# Patient Record
Sex: FEMALE | Race: BLACK OR AFRICAN AMERICAN | NOT HISPANIC OR LATINO | URBAN - METROPOLITAN AREA
[De-identification: names, ages, dates, MRNs, and addresses within clinical notes are randomized per-mention and may not be internally consistent; named-entity substitution may affect disease eponyms.]

---

## 2018-12-01 ENCOUNTER — EMERGENCY (EMERGENCY)
Facility: HOSPITAL | Age: 53
LOS: 1 days | Discharge: HOME | End: 2018-12-01
Admitting: EMERGENCY MEDICINE
Payer: COMMERCIAL

## 2018-12-01 VITALS
RESPIRATION RATE: 18 BRPM | OXYGEN SATURATION: 98 % | TEMPERATURE: 98 F | SYSTOLIC BLOOD PRESSURE: 135 MMHG | HEART RATE: 67 BPM | DIASTOLIC BLOOD PRESSURE: 85 MMHG | HEIGHT: 61 IN | WEIGHT: 173.94 LBS

## 2018-12-01 VITALS
HEART RATE: 63 BPM | TEMPERATURE: 98 F | RESPIRATION RATE: 18 BRPM | OXYGEN SATURATION: 98 % | DIASTOLIC BLOOD PRESSURE: 65 MMHG | SYSTOLIC BLOOD PRESSURE: 107 MMHG

## 2018-12-01 DIAGNOSIS — M79.642 PAIN IN LEFT HAND: ICD-10-CM

## 2018-12-01 DIAGNOSIS — Z79.1 LONG TERM (CURRENT) USE OF NON-STEROIDAL ANTI-INFLAMMATORIES (NSAID): ICD-10-CM

## 2018-12-01 DIAGNOSIS — M25.532 PAIN IN LEFT WRIST: ICD-10-CM

## 2018-12-01 PROCEDURE — 99283 EMERGENCY DEPT VISIT LOW MDM: CPT

## 2018-12-01 PROCEDURE — 99284 EMERGENCY DEPT VISIT MOD MDM: CPT | Mod: 25

## 2018-12-01 PROCEDURE — 73110 X-RAY EXAM OF WRIST: CPT

## 2018-12-01 PROCEDURE — 73130 X-RAY EXAM OF HAND: CPT | Mod: 26,LT

## 2018-12-01 PROCEDURE — 96372 THER/PROPH/DIAG INJ SC/IM: CPT

## 2018-12-01 PROCEDURE — 73110 X-RAY EXAM OF WRIST: CPT | Mod: 26,LT

## 2018-12-01 PROCEDURE — 73130 X-RAY EXAM OF HAND: CPT

## 2018-12-01 RX ORDER — KETOROLAC TROMETHAMINE 30 MG/ML
60 SYRINGE (ML) INJECTION ONCE
Qty: 0 | Refills: 0 | Status: DISCONTINUED | OUTPATIENT
Start: 2018-12-01 | End: 2018-12-01

## 2018-12-01 RX ORDER — IBUPROFEN 200 MG
1 TABLET ORAL
Qty: 30 | Refills: 0
Start: 2018-12-01 | End: 2018-12-10

## 2018-12-01 RX ORDER — OXYCODONE AND ACETAMINOPHEN 5; 325 MG/1; MG/1
1 TABLET ORAL ONCE
Qty: 0 | Refills: 0 | Status: DISCONTINUED | OUTPATIENT
Start: 2018-12-01 | End: 2018-12-01

## 2018-12-01 RX ADMIN — Medication 60 MILLIGRAM(S): at 08:40

## 2018-12-01 RX ADMIN — OXYCODONE AND ACETAMINOPHEN 1 TABLET(S): 5; 325 TABLET ORAL at 10:21

## 2018-12-01 RX ADMIN — OXYCODONE AND ACETAMINOPHEN 1 TABLET(S): 5; 325 TABLET ORAL at 09:51

## 2018-12-01 RX ADMIN — Medication 60 MILLIGRAM(S): at 09:10

## 2018-12-01 NOTE — ED ADULT NURSE NOTE - OBJECTIVE STATEMENT
Pt came ER with c/o pain to her left wrist for about 4 days with no relief from taking Tylenol or Motrin. pt denies injury and states she has not medical problems.

## 2018-12-01 NOTE — ED PROVIDER NOTE - OBJECTIVE STATEMENT
54 y/o female with no sig PMHx c/o left wrist/hand pain x 4 days. pt states pain with movement to left wrist/hand for past 4 days. pt denies trauma. no fever or chills. no numbness or tingling. no weakness. 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 no relief with advil and tylenol. no further complaints.

## 2018-12-01 NOTE — ED ADULT NURSE REASSESSMENT NOTE - NS ED NURSE REASSESS COMMENT FT1
pt was found to be in to much pain upon discharge. Getting percocet and then reassessing, prior to exit.

## 2018-12-01 NOTE — ED PROVIDER NOTE - MEDICAL DECISION MAKING DETAILS
left wrist/hand pain. no evidence of infection on exam. a febrile. neurovascular intact. ? overuse injury vs fx. unlikely gout given no erythema or increase warmth. toradol given for pain. x-rays no acute pathology. velcro splint given, pain meds and f/u with hand.

## 2018-12-01 NOTE — ED PROVIDER NOTE - DIAGNOSTIC INTERPRETATION
xray left wrist: + soft tissue swelling. no fx or dislocation  xray left hand: + soft tissue swelling. no fx or dislocation

## 2020-10-22 NOTE — ED ADULT NURSE NOTE - DISCHARGE DATE/TIME
Curettage Text: The wound bed was treated with curettage after the biopsy was performed. 01-Dec-2018 09:35

## 2021-12-20 ENCOUNTER — APPOINTMENT (OUTPATIENT)
Dept: SURGERY | Facility: CLINIC | Age: 56
End: 2021-12-20
Payer: COMMERCIAL

## 2021-12-20 VITALS
WEIGHT: 167.31 LBS | BODY MASS INDEX: 30.79 KG/M2 | DIASTOLIC BLOOD PRESSURE: 81 MMHG | SYSTOLIC BLOOD PRESSURE: 143 MMHG | HEIGHT: 62 IN | TEMPERATURE: 97.2 F | OXYGEN SATURATION: 97 % | HEART RATE: 59 BPM

## 2021-12-20 DIAGNOSIS — Z78.9 OTHER SPECIFIED HEALTH STATUS: ICD-10-CM

## 2021-12-20 PROCEDURE — 99203 OFFICE O/P NEW LOW 30 MIN: CPT

## 2021-12-20 NOTE — ASSESSMENT
[FreeTextEntry1] : 56 year old female. RLQ fluid filled mass. No delineated by MR. She requires a CT scan of the abdomen and pelvis to start. Delineate potential appendix neoplasm vs other issue. I answered all questions.

## 2021-12-20 NOTE — PHYSICAL EXAM
[JVD] : no jugular venous distention  [Normal Breath Sounds] : Normal breath sounds [Normal Heart Sounds] : normal heart sounds [Abdominal Masses] : No abdominal masses [Abdomen Tenderness] : ~T ~M No abdominal tenderness [Tender] : was nontender [Enlarged] : not enlarged [Purpura] : no purpura  [Alert] : alert [Oriented to Person] : oriented to person [Oriented to Place] : oriented to place [Oriented to Time] : oriented to time [Calm] : calm [de-identified] : VANESSA. Dank. Appropriate. Comfortable. [de-identified] : Pupils equal. No Scleral Icterus. NCAT. (COVID MASK) [de-identified] : Supple. Trachea midline. No overt lymphadenopathy. No JVD [de-identified] : Abdomen is soft, non tender and non distended. Do not appreciate any overt mass. No overt hernia detected. There is a well healed scar lower abdomen. Fullness in RLQ.

## 2021-12-20 NOTE — HISTORY OF PRESENT ILLNESS
[de-identified] : 56 year old female. fluid filled lesion seen on MR and send by her GYN Dr. Vigil. She reports having had fibroid surgery many years ago. Has had some low pelvic fullness over past weeks/months. Denies any fevers, chills or shortness of breath. Reports her bowel function has been normal. 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 Denies any weight loss. No blood per rectum. Appetite is normal. MR from [facility] radiology reviewed : unclear etiology fluid filled structure RLQ. Patient reports last colonoscopy was >5 years ago.

## 2022-01-10 ENCOUNTER — APPOINTMENT (OUTPATIENT)
Dept: SURGERY | Facility: CLINIC | Age: 57
End: 2022-01-10
Payer: COMMERCIAL

## 2022-01-10 VITALS
TEMPERATURE: 95.8 F | OXYGEN SATURATION: 98 % | BODY MASS INDEX: 22.13 KG/M2 | WEIGHT: 163.38 LBS | SYSTOLIC BLOOD PRESSURE: 130 MMHG | HEART RATE: 63 BPM | HEIGHT: 72 IN | DIASTOLIC BLOOD PRESSURE: 77 MMHG

## 2022-01-10 PROCEDURE — 99213 OFFICE O/P EST LOW 20 MIN: CPT

## 2022-01-11 ENCOUNTER — NON-APPOINTMENT (OUTPATIENT)
Age: 57
End: 2022-01-11

## 2022-01-12 ENCOUNTER — APPOINTMENT (OUTPATIENT)
Dept: COLORECTAL SURGERY | Facility: CLINIC | Age: 57
End: 2022-01-12
Payer: COMMERCIAL

## 2022-01-12 VITALS
BODY MASS INDEX: 30 KG/M2 | HEIGHT: 62 IN | WEIGHT: 163 LBS | SYSTOLIC BLOOD PRESSURE: 121 MMHG | DIASTOLIC BLOOD PRESSURE: 73 MMHG | TEMPERATURE: 97.8 F | HEART RATE: 73 BPM

## 2022-01-12 DIAGNOSIS — Z01.812 ENCOUNTER FOR PREPROCEDURAL LABORATORY EXAMINATION: ICD-10-CM

## 2022-01-12 PROCEDURE — 99205 OFFICE O/P NEW HI 60 MIN: CPT

## 2022-01-12 RX ORDER — POLYETHYLENE GLYCOL 3350 AND ELECTROLYTES WITH LEMON FLAVOR 236; 22.74; 6.74; 5.86; 2.97 G/4L; G/4L; G/4L; G/4L; G/4L
236 POWDER, FOR SOLUTION ORAL
Qty: 1 | Refills: 0 | Status: ACTIVE | COMMUNITY
Start: 2022-01-12 | End: 1900-01-01

## 2022-01-12 NOTE — PHYSICAL EXAM
[FreeTextEntry1] : Medical assistant present for duration of physical examination\par \par General no acute distress, alert and oriented\par Psych calm, pleasant demeanor, responding appropriately to question\par Well nourished\par Comfortable in chair\par Ambulating without assistance\par Nonlabored breathing\par Abdomen obese, soft, nondistended, nontender, no rebound or guarding, well healed transverse lower abdominal/suprapubic Pfannenstiel scar\par \par

## 2022-01-12 NOTE — HISTORY OF PRESENT ILLNESS
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 Kenney\par  x 2\par \par Evaluated by Dr. Kenney for initial consultation on 21. She was referred to Dr. Kenney by her GYN, Dr. Vigil. She has a h/o fibroids and underwent myomectomy () previously. She began to notice pelvic fullness over the past several months and was sent for MRI. Based on the findings she was then referred to Dr. Kenney. 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 Malignant transformation to mucinous cystadenocarcinoma cannot be excluded\par \par Pt admits to feeling intermittent fullness and pressure sensation in lower pelvis for the last couple months which resolves on its own for up to a week until randomly returning.\par Denies fever, unintentional weight loss, n/v/d, BRBPR\par BH: 1-2 times daily, admits to h/o constipation depending on diet, improved w/ drinking milk and increasing water and dietary fiber intake\par \par Last colonoscopy 5-6 years ago at [facility] w/ Dr. Frankel (who has since retired). Normal per patient.\par Denies [facility] CRC or IBD

## 2022-01-12 NOTE — ASSESSMENT
[FreeTextEntry1] : I had extensive discussion with the patient regarding the diagnosis and treatment options.\par Discussed with patient's daughter Rose over phone at request of patient.\par Outside imaging reviewed - concerning for mucocele of appendix.\par Possibility of underlying malignancy and malignant transformation discussed with patient. \par \par I recommended that the patient consider proceeding with surgical management.\par Recommend preoperative colonoscopy.\par We discussed diagnostic laparoscopy to evaluate for peritoneal spread. If identified, we discussed the need for referral to surgical oncology for further treatment.\par We discussed laparoscopic possible open appendectomy, possible right hemicolectomy.\par We discussed if appendectomy performed, the need for possible completion colectomy pending pathology analysis of specimen.\par \par The associated risks, benefits, alternatives of the procedure have been outlined discussed and reviewed with the patient. These risks including but not limited to bleeding, infection, injury to adjacent organs, anastomotic leak, need for secondary surgery, need for stoma creation, incisional dehiscence, incisional hernia, change in bowel habits, change in urinary function, nerve injury, as well as the risk of heart and lung complications, stroke, DVT/PE and death were detailed. All questions were answered, the patient expressed understanding and consents to the planned procedure. \par \par Appropriate literature regarding surgery and post operative treatment/complications and enhanced recovery pathway has been detailed and reviewed. Consent was obtained. All questions were answered. Any family member can contact our office for further discussion with the patient's permission.\par \par Discussed with Dr Kenney, will proceed with combined surgical intervention.\par

## 2022-01-12 NOTE — ASSESSMENT
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 Diagnostic laparoscopy/ potential open surgery, appendectomy vs ileocecectomy vs right fidel colectomy will be indicated depending on surgical findings. We discussed the risks, benefits and alternatives to diagnostic laparotomy, possible laparotomy, surgical resection of the lesion including but not limited to bleeding, infection, death, disability, recurrence, need for additional procedure, anastomotic leak, abscess, rupture of tumor, nerve injury, displeasure with cosmetic outcome, blood clots, cardiac and pulmonary issues and other issues. Referral for Dr. Eriberto elmore Will coordinate with her office for surgical intervention/treatment.\par

## 2022-01-12 NOTE — HISTORY OF PRESENT ILLNESS
[de-identified] : 56 year old female. fluid filled lesion seen on MR and send by her GYN Dr. Vigil. She reports having had fibroid surgery many years ago. Has had some low pelvic fullness over past weeks/months. Denies any fevers, chills or shortness of breath. Reports her bowel function has been normal. 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 Denies any weight loss. No blood per rectum. Appetite is normal. MR from [facility] radiology reviewed : unclear etiology fluid filled structure RLQ. Patient reports last colonoscopy was >5 years ago.  [de-identified] : 10- - patietn returns today with daughter. Had CT scan. Confirm likely mucocele of appendix. Otherwise feels well. We discussed likely need for extirpative surgery. I have contacted our CRS service Dr. Wei for management of the oncologic aspects of the procedure.

## 2022-01-12 NOTE — PHYSICAL EXAM
[JVD] : no jugular venous distention  [Normal Breath Sounds] : Normal breath sounds [Normal Heart Sounds] : normal heart sounds [Abdominal Masses] : No abdominal masses [Abdomen Tenderness] : ~T ~M No abdominal tenderness [Tender] : was nontender [Enlarged] : not enlarged [Purpura] : no purpura  [Alert] : alert [Oriented to Person] : oriented to person [Oriented to Place] : oriented to place [Oriented to Time] : oriented to time [Calm] : calm [de-identified] : VANESSA. Dank. Appropriate. Comfortable. [de-identified] : Pupils equal. No Scleral Icterus. NCAT. (COVID MASK) [de-identified] : Supple. Trachea midline. No overt lymphadenopathy. No JVD [de-identified] : Abdomen is soft, non tender and non distended. Do not appreciate any overt mass. No overt hernia detected. There is a well healed scar lower abdomen. Fullness in RLQ.

## 2022-01-18 ENCOUNTER — OUTPATIENT (OUTPATIENT)
Dept: OUTPATIENT SERVICES | Facility: HOSPITAL | Age: 57
LOS: 1 days | Discharge: HOME | End: 2022-01-18
Payer: COMMERCIAL

## 2022-01-18 ENCOUNTER — APPOINTMENT (OUTPATIENT)
Dept: GASTROENTEROLOGY | Facility: HOSPITAL | Age: 57
End: 2022-01-18

## 2022-01-18 ENCOUNTER — TRANSCRIPTION ENCOUNTER (OUTPATIENT)
Age: 57
End: 2022-01-18

## 2022-01-18 ENCOUNTER — RESULT REVIEW (OUTPATIENT)
Age: 57
End: 2022-01-18

## 2022-01-18 LAB — SARS-COV-2 N GENE NPH QL NAA+PROBE: NOT DETECTED

## 2022-01-18 PROCEDURE — 45380 COLONOSCOPY AND BIOPSY: CPT | Mod: PT

## 2022-01-18 PROCEDURE — 88305 TISSUE EXAM BY PATHOLOGIST: CPT

## 2022-01-18 PROCEDURE — 88305 TISSUE EXAM BY PATHOLOGIST: CPT | Mod: 26

## 2022-01-18 PROCEDURE — 45380 COLONOSCOPY AND BIOPSY: CPT

## 2022-01-20 LAB — SURGICAL PATHOLOGY STUDY: SIGNIFICANT CHANGE UP

## 2022-01-26 ENCOUNTER — NON-APPOINTMENT (OUTPATIENT)
Age: 57
End: 2022-01-26

## 2022-01-31 ENCOUNTER — LABORATORY RESULT (OUTPATIENT)
Age: 57
End: 2022-01-31

## 2022-02-01 ENCOUNTER — TRANSCRIPTION ENCOUNTER (OUTPATIENT)
Age: 57
End: 2022-02-01

## 2022-02-01 VITALS
DIASTOLIC BLOOD PRESSURE: 72 MMHG | RESPIRATION RATE: 18 BRPM | OXYGEN SATURATION: 98 % | HEIGHT: 62 IN | HEART RATE: 72 BPM | SYSTOLIC BLOOD PRESSURE: 112 MMHG | TEMPERATURE: 99 F | WEIGHT: 160.94 LBS

## 2022-02-01 RX ORDER — NEOMYCIN SULFATE 500 MG/1
500 TABLET ORAL
Qty: 6 | Refills: 0 | Status: ACTIVE | COMMUNITY
Start: 2022-02-01 | End: 1900-01-01

## 2022-02-01 RX ORDER — METRONIDAZOLE 500 MG/1
500 TABLET ORAL
Qty: 6 | Refills: 0 | Status: ACTIVE | COMMUNITY
Start: 2022-02-01 | End: 1900-01-01

## 2022-02-01 NOTE — ASU PATIENT PROFILE, ADULT - FALL HARM RISK - UNIVERSAL INTERVENTIONS
Bed in lowest position, wheels locked, appropriate side rails in place/Call bell, personal items and telephone in reach/Instruct patient to call for assistance before getting out of bed or chair/Non-slip footwear when patient is out of bed/[place] to call system/Physically safe environment - no spills, clutter or unnecessary equipment/Purposeful Proactive Rounding/Room/bathroom lighting operational, light cord in reach

## 2022-02-01 NOTE — ASU PATIENT PROFILE, ADULT - BRAND OF FIRST COVID-19 BOOSTER
Plan: Recommended daily sunscreen of SPF 30 or higher, reapplied after 2 hours of cumulative sun exposure.  Sunscreens should be applied at least 15 minutes prior to sun exposure.  Sun protective clothing was discussed.
Detail Level: Zone
[employer]

## 2022-02-02 ENCOUNTER — RESULT REVIEW (OUTPATIENT)
Age: 57
End: 2022-02-02

## 2022-02-02 ENCOUNTER — TRANSCRIPTION ENCOUNTER (OUTPATIENT)
Age: 57
End: 2022-02-02

## 2022-02-02 ENCOUNTER — OUTPATIENT (OUTPATIENT)
Dept: OUTPATIENT SERVICES | Facility: HOSPITAL | Age: 57
LOS: 1 days | Discharge: HOME | End: 2022-02-02
Payer: COMMERCIAL

## 2022-02-02 ENCOUNTER — APPOINTMENT (OUTPATIENT)
Dept: COLORECTAL SURGERY | Facility: HOSPITAL | Age: 57
End: 2022-02-02

## 2022-02-02 VITALS
TEMPERATURE: 97 F | DIASTOLIC BLOOD PRESSURE: 57 MMHG | RESPIRATION RATE: 20 BRPM | SYSTOLIC BLOOD PRESSURE: 113 MMHG | HEART RATE: 54 BPM | OXYGEN SATURATION: 100 %

## 2022-02-02 DIAGNOSIS — Z98.890 OTHER SPECIFIED POSTPROCEDURAL STATES: Chronic | ICD-10-CM

## 2022-02-02 LAB
BLD GP AB SCN SERPL QL: NEGATIVE — SIGNIFICANT CHANGE UP
GLUCOSE BLDC GLUCOMTR-MCNC: 103 MG/DL — HIGH (ref 70–99)
RH IG SCN BLD-IMP: POSITIVE — SIGNIFICANT CHANGE UP

## 2022-02-02 PROCEDURE — 88304 TISSUE EXAM BY PATHOLOGIST: CPT | Mod: 26

## 2022-02-02 PROCEDURE — 44970 LAPAROSCOPY APPENDECTOMY: CPT

## 2022-02-02 PROCEDURE — 44970 LAPAROSCOPY APPENDECTOMY: CPT | Mod: 80

## 2022-02-02 DEVICE — IMPLANTABLE DEVICE: Type: IMPLANTABLE DEVICE | Status: FUNCTIONAL

## 2022-02-02 DEVICE — CLIP APPLIER COVIDIEN ENDOCLIP II 10MM MED/LG: Type: IMPLANTABLE DEVICE | Status: FUNCTIONAL

## 2022-02-02 DEVICE — CLIP APPLIER COVIDIEN ENDOCLIP 5MM: Type: IMPLANTABLE DEVICE | Status: FUNCTIONAL

## 2022-02-02 DEVICE — STAPLER COVIDIEN TRI-STAPLE 60MM PURPLE RELOAD: Type: IMPLANTABLE DEVICE | Status: FUNCTIONAL

## 2022-02-02 DEVICE — VISTASEAL FIBRIN HUMAN 4ML: Type: IMPLANTABLE DEVICE | Status: FUNCTIONAL

## 2022-02-02 DEVICE — STAPLER ETHICON PROXIMATE 75MM WITH BLUE RELOAD: Type: IMPLANTABLE DEVICE | Status: FUNCTIONAL

## 2022-02-02 RX ORDER — OXYCODONE HYDROCHLORIDE 5 MG/1
5 TABLET ORAL EVERY 4 HOURS
Refills: 0 | Status: DISCONTINUED | OUTPATIENT
Start: 2022-02-02 | End: 2022-02-02

## 2022-02-02 RX ORDER — BUPIVACAINE 13.3 MG/ML
20 INJECTION, SUSPENSION, LIPOSOMAL INFILTRATION ONCE
Refills: 0 | Status: DISCONTINUED | OUTPATIENT
Start: 2022-02-02 | End: 2022-02-02

## 2022-02-02 RX ORDER — ACETAMINOPHEN 500 MG
1000 TABLET ORAL ONCE
Refills: 0 | Status: COMPLETED | OUTPATIENT
Start: 2022-02-02 | End: 2022-02-02

## 2022-02-02 RX ORDER — OXYCODONE HYDROCHLORIDE 5 MG/1
1 TABLET ORAL
Qty: 20 | Refills: 0
Start: 2022-02-02 | End: 2022-02-05

## 2022-02-02 RX ORDER — ACETAMINOPHEN 500 MG
650 TABLET ORAL EVERY 6 HOURS
Refills: 0 | Status: DISCONTINUED | OUTPATIENT
Start: 2022-02-02 | End: 2022-02-02

## 2022-02-02 RX ORDER — HEPARIN SODIUM 5000 [USP'U]/ML
5000 INJECTION INTRAVENOUS; SUBCUTANEOUS ONCE
Refills: 0 | Status: COMPLETED | OUTPATIENT
Start: 2022-02-02 | End: 2022-02-02

## 2022-02-02 RX ORDER — OXYCODONE HYDROCHLORIDE 5 MG/1
10 TABLET ORAL EVERY 4 HOURS
Refills: 0 | Status: DISCONTINUED | OUTPATIENT
Start: 2022-02-02 | End: 2022-02-02

## 2022-02-02 RX ORDER — HYDROMORPHONE HYDROCHLORIDE 2 MG/ML
0.5 INJECTION INTRAMUSCULAR; INTRAVENOUS; SUBCUTANEOUS
Refills: 0 | Status: DISCONTINUED | OUTPATIENT
Start: 2022-02-02 | End: 2022-02-02

## 2022-02-02 RX ORDER — DOCUSATE SODIUM 100 MG
1 CAPSULE ORAL
Qty: 90 | Refills: 0
Start: 2022-02-02 | End: 2022-03-03

## 2022-02-02 RX ADMIN — HEPARIN SODIUM 5000 UNIT(S): 5000 INJECTION INTRAVENOUS; SUBCUTANEOUS at 07:57

## 2022-02-02 RX ADMIN — Medication 1000 MILLIGRAM(S): at 07:57

## 2022-02-02 NOTE — H&P ADULT - ASSESSMENT
Ms. Jasso is a 56 YOF who presents today for lap appendectomy possible right hemicolectomy for an incidentally found appendiceal neoplasm. Planned for lap appendectomy vs. R hemicolectomy, and admission postop depending on perioperative course.

## 2022-02-02 NOTE — ASU DISCHARGE PLAN (ADULT/PEDIATRIC) - NS MD DC FALL RISK RISK
For information on Fall & Injury Prevention, visit: [email or web address] OR  [email or web address] OR  https://www.cdc.gov/steadi/patient.html

## 2022-02-02 NOTE — H&P ADULT - HISTORY OF PRESENT ILLNESS
Ms. Jasso is a 56 YOF who presents today for lap appendectomy possible right hemicolectomy for an incidentally found appendiceal neoplasm. She denies any changes to her medical history since she was last seen in clinic with Dr. Wei. She tolerated the prep well.

## 2022-02-02 NOTE — DISCHARGE NOTE NURSING/CASE MANAGEMENT/SOCIAL WORK - PATIENT PORTAL LINK FT
You can access the FollowMyHealth Patient Portal offered by [facility] by registering at the following website: [email or web address]. By joining [facility]’s FollowMyHealth portal, you will also be able to view your health information using other applications (apps) compatible with our system.

## 2022-02-02 NOTE — BRIEF OPERATIVE NOTE - OPERATION/FINDINGS
6 x 3 cm dilated appendix, resected with single firing of purple CELESTE stapler with cecal cuff. No perforation/spillage of specimen noted during dissection, resection, or extraction.

## 2022-02-02 NOTE — DISCHARGE NOTE NURSING/CASE MANAGEMENT/SOCIAL WORK - NSDCPEFALRISK_GEN_ALL_CORE
For information on Fall & Injury Prevention, visit: [email or web address] OR  [email or web address] OR  https://www.cdc.gov/steadi/patient.html

## 2022-02-04 PROBLEM — D49.0 NEOPLASM OF UNSPECIFIED BEHAVIOR OF DIGESTIVE SYSTEM: Chronic | Status: ACTIVE | Noted: 2022-02-01

## 2022-02-09 LAB — SURGICAL PATHOLOGY STUDY: SIGNIFICANT CHANGE UP

## 2022-02-17 ENCOUNTER — APPOINTMENT (OUTPATIENT)
Dept: COLORECTAL SURGERY | Facility: CLINIC | Age: 57
End: 2022-02-17
Payer: COMMERCIAL

## 2022-02-17 VITALS
HEART RATE: 70 BPM | WEIGHT: 160 LBS | HEIGHT: 62 IN | TEMPERATURE: 97.3 F | DIASTOLIC BLOOD PRESSURE: 66 MMHG | BODY MASS INDEX: 29.44 KG/M2 | SYSTOLIC BLOOD PRESSURE: 122 MMHG

## 2022-02-17 PROCEDURE — 99024 POSTOP FOLLOW-UP VISIT: CPT

## 2022-02-17 NOTE — HISTORY OF PRESENT ILLNESS
[FreeTextEntry1] : 55 yo F presents for post operative visit of mucocele of the appendix\par  x 2\par h/o fibroids s/p myomectomy ()\par \par s/p diagnostic laparoscopy c/w appendiceal neoplasm, mucocele and laparoscopic appendectomy on 22\par \par Surgical Pathology 22: \par Specimens: \par 1. Appendiceal neoplasm\par \par Final diagnosis: \par Appendix, appendectomy\par - Low-grade appendiceal mucinous neoplasm. \par - Lesion confined to appendix with no extravasation of mucin through wall. \par \par Pt reports she feels well, appetite and energy good and resumed regular diet\par Moving bowels daily, denies complaint\par Admits to some fullness in abdomen which she attributes to gas. Reports this is a different sensation than the prior c/o pelvic fullness\par Denies fever, body aches or chills, denies abd pain, n/v/c/d\par \par Taking Aleve for left foot pain\par Initially patient seen by Dr. Kenney: on 21. She was referred to Dr. Kenney by her GYN, Dr. Vigil. She began to notice pelvic fullness over the past several months and was sent for MRI. Based on the findings she was then referred to Dr. Kenney. 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 Malignant transformation to mucinous cystadenocarcinoma cannot be excluded\par \par Denies [facility] CRC or IBD\par Last colonoscopy w/ Dr. Preston on 22:\par Findings:  \par Mucosa Normal mucosa was noted in the whole colon. The appendiceal orifice \par appeared normal s/p karen-orificial biopsies.\par Pathology:\par Final Diagnosis\par Appendix, orifice, biopsy:\par - Colonic mucosa within normal limits, multiple levels performed.\par

## 2022-02-17 NOTE — ASSESSMENT
[FreeTextEntry1] : Recovering well, patient reassured\par Pathology discussed - LAMN\par Management and surveillance discussed with surgical oncology Dr Castillo and 2018 [employer] Consensus guidelines reviewed - routine postop care, no further surveillance\par \par Continue to liberalize diet\par Continue to avoid heavy lifting and strenuous activity for 6-8 weeks postop\par Over the counter pain control prn\par Local wound care discussed\par F/u in 1 month or sooner as needed\par All questions were answered, patient expressed understanding, and is agreeable to this plan.\par

## 2022-02-17 NOTE — PHYSICAL EXAM
[FreeTextEntry1] : Medical assistant present for duration of physical examination\par \par General no acute distress, alert and oriented\par Psych calm, pleasant, in good spirits\par Well nourished\par Comfortable in chair\par Ambulating without assistance\par Nonlabored breathing\par Abdomen obese, soft, nondistended, nontender, no rebound or guarding, well healing surgical incisions at port sites without erythema, induration or drainage, no hernias appreciated\par \par

## 2022-02-23 PROCEDURE — 82962 GLUCOSE BLOOD TEST: CPT

## 2022-02-23 PROCEDURE — C9399: CPT

## 2022-02-23 PROCEDURE — 86900 BLOOD TYPING SEROLOGIC ABO: CPT

## 2022-02-23 PROCEDURE — 44970 LAPAROSCOPY APPENDECTOMY: CPT

## 2022-02-23 PROCEDURE — C1889: CPT

## 2022-02-23 PROCEDURE — 86850 RBC ANTIBODY SCREEN: CPT

## 2022-02-23 PROCEDURE — 86901 BLOOD TYPING SEROLOGIC RH(D): CPT

## 2022-02-23 PROCEDURE — 88304 TISSUE EXAM BY PATHOLOGIST: CPT

## 2022-03-22 ENCOUNTER — APPOINTMENT (OUTPATIENT)
Dept: COLORECTAL SURGERY | Facility: CLINIC | Age: 57
End: 2022-03-22
Payer: COMMERCIAL

## 2022-03-22 VITALS
HEART RATE: 67 BPM | HEIGHT: 62 IN | WEIGHT: 157 LBS | TEMPERATURE: 97.5 F | DIASTOLIC BLOOD PRESSURE: 74 MMHG | BODY MASS INDEX: 28.89 KG/M2 | SYSTOLIC BLOOD PRESSURE: 116 MMHG

## 2022-03-22 DIAGNOSIS — D37.3 NEOPLASM OF UNCERTAIN BEHAVIOR OF APPENDIX: ICD-10-CM

## 2022-03-22 PROCEDURE — 99024 POSTOP FOLLOW-UP VISIT: CPT

## 2022-03-22 NOTE — ASSESSMENT
[FreeTextEntry1] : Recovered well.\par F/u in 1 year or sooner as needed.\par All questions were answered, patient expressed understanding, and is agreeable to this plan.\par

## 2022-03-22 NOTE — PHYSICAL EXAM
[FreeTextEntry1] : Medical assistant present for duration of physical examination\par \par General no acute distress, alert and oriented\par Psych calm, pleasant, in good spirits\par Well nourished\par Comfortable in chair\par Ambulating without assistance\par Nonlabored breathing\par Abdomen obese, soft, nondistended, nontender, no rebound or guarding, well healed surgical scars, no hernias appreciated\par \par

## 2022-03-22 NOTE — HISTORY OF PRESENT ILLNESS
[FreeTextEntry1] : Pt is a 57 yo F presents for post of visit of mucocele of the appendix \par  x 2\par h/o fibroids s/p myomectomy ()\par pt initially evaluated 22 for pelvic fullness referred to Dr. Kenney by GYN, Dr. Vigil and had been sent for MRI pelvis 21 showing cystic lesion arising from appendix. CT A/P 21 concerning for mucocele of appendix. pt sent for colonoscopy with Dr. Preston following visit 22 which noted normal mucosa of the whole colon, appendiceal orifice appeared normal.\par \par s/p laparoscopic appendectomy on 22 surgical Pathology c/w Low-grade appendiceal mucinous neoplasm, Lesion confined to appendix with no extravasation of mucin through wall. \par \par Pt presents for second post-op visit \par Pt Last seen 22\par pt states she has been doing well since last visit, reports improved pain, denies use of pain medications. \par Pt reports increased dietary fiber and water, feeling well.\par BH: 1-2 x daily soft formed stools, no straining.\par Pt washes wound sites with water and applies A&D daily.\par \par denies fever, chills, abdominal pain, bleeding, n/v/d/c, wound discharge \par \par \par Previous imaging: \par \par MRI pelvis completed 21\par - sausage-shaped cystic lesion most likely arising from appendix with differential diagnosis of mucinous cystadenoma\par - no discrete fibroid\par - normal appearing postmenopausal pelvis \par \par CT A/P completed 21 at [facility] \par - fluid-filled tubular structure, extending from the cecum, 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 Malignant transformation to mucinous cystadenocarcinoma cannot be excluded\par \par \par Denies [facility] CRC or IBD\par Last colonoscopy w/ Dr. Preston on 22:\par Findings: \par Mucosa Normal mucosa was noted in the whole colon. The appendiceal orifice \par appeared normal s/p karen-orificial biopsies.\par Pathology:\par Final Diagnosis\par Appendix, orifice, biopsy:\par - Colonic mucosa within normal limits, multiple levels performed.\par \par \par

## (undated) DEVICE — Device

## (undated) DEVICE — DRAPE GENERAL ENDOSCOPY

## (undated) DEVICE — TROCAR COVIDIEN VERSASTEP 5MM SHORT

## (undated) DEVICE — WARMING BLANKET UPPER ADULT

## (undated) DEVICE — D HELP - CLEARVIEW CLEARIFY SYSTEM

## (undated) DEVICE — GOWN TRIMAX LG

## (undated) DEVICE — SUT POLYSORB 3-0 30" V-20 UNDYED

## (undated) DEVICE — DRSG OPSITE 13.75 X 4"

## (undated) DEVICE — VENODYNE/SCD SLEEVE CALF LARGE

## (undated) DEVICE — FORCEP RADIAL JAW 4 W NDL 2.2MM 2.8MM 240CM ORANGE DISP

## (undated) DEVICE — PREP CHLORAPREP HI-LITE ORANGE 26ML

## (undated) DEVICE — TROCAR APPLIED MEDICAL KII BALLOON BLUNT TIP 12MM X 100MM

## (undated) DEVICE — TROCAR COVIDIEN VERSASTEP PLUS 11MM STANDARD

## (undated) DEVICE — GLV 8 PROTEXIS (WHITE)

## (undated) DEVICE — SUT BIOSYN 4-0 18" P-12

## (undated) DEVICE — TROCAR APPLIED MEDICAL KII BALLOON BLUNT TIP 12MM X 130MM

## (undated) DEVICE — GELPORT LAPAROSCOPIC SYSTEM

## (undated) DEVICE — SUT MAXON 1 36" GS-24

## (undated) DEVICE — DRSG TEGADERM 6"X8"

## (undated) DEVICE — POSITIONER FOAM EGG CRATE ULNAR 2PCS (PINK)

## (undated) DEVICE — INSUFFLATION NDL COVIDIEN STEP 14G FOR STEP/VERSASTEP

## (undated) DEVICE — TROCAR COVIDIEN VERSASTEP PLUS 12MM STANDARD

## (undated) DEVICE — TUBING INSUFFLATION LAP FILTER 10FT

## (undated) DEVICE — FOLEY TRAY 16FR 5CC LTX UMETER CLOSED

## (undated) DEVICE — GLV 7.5 PROTEXIS (WHITE)

## (undated) DEVICE — VALVE YELLOW PORT SEAL PLUS 5MM

## (undated) DEVICE — SPECIMEN CONTAINER 100ML

## (undated) DEVICE — GLV 7 PROTEXIS (WHITE)

## (undated) DEVICE — PACK BASIN SPECIAL PROCEDURE

## (undated) DEVICE — GLV 6.5 PROTEXIS (WHITE)

## (undated) DEVICE — DISSECTOR ENDO PEANUT 5MM

## (undated) DEVICE — TROCAR ETHICON ENDOPATH XCEL BLADELESS 5MM X 100MM STABILITY

## (undated) DEVICE — SOL IRR POUR NS 0.9% 500ML

## (undated) DEVICE — ENDOCATCH 10MM SPECIMEN POUCH

## (undated) DEVICE — LIGASURE MARYLAND 37CM

## (undated) DEVICE — DRAPE INSTRUMENT POUCH 6.75" X 11"

## (undated) DEVICE — DRSG OPSITE 2.5 X 2"

## (undated) DEVICE — DRSG CURITY GAUZE SPONGE 4 X 4" 12-PLY

## (undated) DEVICE — DRSG STERISTRIPS 0.5 X 4"

## (undated) DEVICE — GLV 8.5 PROTEXIS (WHITE)

## (undated) DEVICE — MEDICATION LABELS W MARKER

## (undated) DEVICE — VISITEC 4X4

## (undated) DEVICE — TAPE UMBILICAL 1/8 X 18" STRANDS

## (undated) DEVICE — DRAPE MAYO STAND 30"

## (undated) DEVICE — TROCAR COVIDIEN BLUNT TIP HASSAN 12MM

## (undated) DEVICE — LAP PAD 18 X 18"

## (undated) DEVICE — NDL HYPO SAFE 22G X 1.5" (BLACK)

## (undated) DEVICE — BLADE SCALPEL SAFETYLOCK #15

## (undated) DEVICE — TROCAR COVIDIEN STEP 5MM SHORT 70MM

## (undated) DEVICE — SUT POLYSORB 0 30" GS-23

## (undated) DEVICE — SYR LUER LOK 10CC

## (undated) DEVICE — INSUFFLATION NDL COVIDIEN STEP 14G SHORT FOR STEP/VERSASTEP

## (undated) DEVICE — FEEDING TUBE NG SUMP 16FR 48"

## (undated) DEVICE — STAPLER COVIDIEN ENDO GIA STANDARD HANDLE

## (undated) DEVICE — SOL IRR POUR H2O 250ML

## (undated) DEVICE — DRAPE TOWEL BLUE 17" X 24"

## (undated) DEVICE — STAPLER SKIN VISI-STAT 35 WIDE

## (undated) DEVICE — DRSG TELFA 3 X 8

## (undated) DEVICE — PACK MAJOR ABDOMINAL SUPINE

## (undated) DEVICE — LIGASURE IMPACT

## (undated) DEVICE — TUBING IRRIGATION DAVOL SYSTEM X STREAM

## (undated) DEVICE — TROCAR COVIDIEN BLUNT TIP HASSAN 10MM

## (undated) DEVICE — BLADE SCALPEL SAFETYLOCK #10

## (undated) DEVICE — SUT CHROMIC 1 30" GS-21

## (undated) DEVICE — PACK LAP CHOLE LAP APPENDECTOMY

## (undated) DEVICE — SUT CHROMIC 3-0 30" V-20

## (undated) DEVICE — LIGASURE BLUNT TIP 37CM